# Patient Record
Sex: FEMALE | Race: WHITE | Employment: FULL TIME
[De-identification: names, ages, dates, MRNs, and addresses within clinical notes are randomized per-mention and may not be internally consistent; named-entity substitution may affect disease eponyms.]

---

## 2021-09-08 ENCOUNTER — TRANSCRIBE ORDERS (OUTPATIENT)
Dept: OTHER | Age: 56
End: 2021-09-08

## 2021-09-08 DIAGNOSIS — M25.552 HIP PAIN, LEFT: ICD-10-CM

## 2021-09-08 DIAGNOSIS — M54.16 LUMBAR RADICULAR PAIN: Primary | ICD-10-CM

## 2021-09-15 ENCOUNTER — TELEPHONE (OUTPATIENT)
Dept: NEUROSURGERY | Facility: CLINIC | Age: 56
End: 2021-09-15

## 2021-09-15 NOTE — TELEPHONE ENCOUNTER
I spoke to the patient she is requesting an appt after Oct 1st. Patient scheduled with Dr. Sanchez for 10/07/21 at 11:10 am. She was given our clinic address.

## 2021-09-16 NOTE — TELEPHONE ENCOUNTER
SPINE PATIENTS - NEW PROTOCOL PREVISIT    RECORDS RECEIVED FROM: External   REASON FOR VISIT: Lumbar radicular pain Hip pain, left   Date of Appt: 10/7/21   NOTES (FOR ALL VISITS) STATUS DETAILS   OFFICE NOTE from referring provider Care Everywhere Amber Davenport NP @ [facility] PCP:  9/9/21   OFFICE NOTE from other specialist N/A    DISCHARGE SUMMARY from hospital N/A    DISCHARGE REPORT from ER N/A    EMG REPORT N/A    MEDICATION LIST Care Everywhere    IMAGING  (FOR ALL VISITS)     MRI (HEAD, NECK, SPINE) Received [facility]:  MRI Lumbar Spine 8/19/21  MRI Lumbar Spine 3/16/21   XRAY (SPINE) *NEUROSURGERY* Received [facility]:  XR Cervical Spine 11/25/20  XR Cervical Spine 11/16/20   CT (HEAD, NECK, SPINE) N/A       Action 9/16/21 MV 11.42am   Action Taken Imaging request faxed to [facility] for:  MRI Lumbar Spine 8/19/21  MRI Lumbar Spine 3/16/21  XR Cervical Spine 11/25/20  XR Cervical Spine 11/16/20    --9/17/21 MV 2.57pm--  Images resolved in PACS

## 2021-09-29 DIAGNOSIS — M54.50 LOW BACK PAIN: Primary | ICD-10-CM

## 2021-10-05 ENCOUNTER — TELEPHONE (OUTPATIENT)
Dept: NEUROSURGERY | Facility: CLINIC | Age: 56
End: 2021-10-05

## 2021-10-07 ENCOUNTER — ANCILLARY PROCEDURE (OUTPATIENT)
Dept: GENERAL RADIOLOGY | Facility: CLINIC | Age: 56
End: 2021-10-07
Attending: NEUROLOGICAL SURGERY
Payer: COMMERCIAL

## 2021-10-07 ENCOUNTER — OFFICE VISIT (OUTPATIENT)
Dept: NEUROSURGERY | Facility: CLINIC | Age: 56
End: 2021-10-07
Payer: COMMERCIAL

## 2021-10-07 ENCOUNTER — PRE VISIT (OUTPATIENT)
Dept: NEUROSURGERY | Facility: CLINIC | Age: 56
End: 2021-10-07

## 2021-10-07 VITALS
SYSTOLIC BLOOD PRESSURE: 142 MMHG | OXYGEN SATURATION: 97 % | HEART RATE: 78 BPM | WEIGHT: 145 LBS | RESPIRATION RATE: 16 BRPM | BODY MASS INDEX: 25.69 KG/M2 | DIASTOLIC BLOOD PRESSURE: 91 MMHG | HEIGHT: 63 IN

## 2021-10-07 DIAGNOSIS — M54.16 LUMBAR RADICULOPATHY: Primary | ICD-10-CM

## 2021-10-07 DIAGNOSIS — M40.30 FLATBACK SYNDROME: ICD-10-CM

## 2021-10-07 DIAGNOSIS — M54.50 LOW BACK PAIN: ICD-10-CM

## 2021-10-07 DIAGNOSIS — M54.12 CERVICAL RADICULOPATHY: ICD-10-CM

## 2021-10-07 PROCEDURE — 77073 BONE LENGTH STUDIES: CPT | Performed by: STUDENT IN AN ORGANIZED HEALTH CARE EDUCATION/TRAINING PROGRAM

## 2021-10-07 PROCEDURE — 72082 X-RAY EXAM ENTIRE SPI 2/3 VW: CPT | Performed by: STUDENT IN AN ORGANIZED HEALTH CARE EDUCATION/TRAINING PROGRAM

## 2021-10-07 PROCEDURE — 99204 OFFICE O/P NEW MOD 45 MIN: CPT | Performed by: NEUROLOGICAL SURGERY

## 2021-10-07 RX ORDER — HYDROCODONE BITARTRATE AND ACETAMINOPHEN 5; 325 MG/1; MG/1
1 TABLET ORAL
COMMUNITY

## 2021-10-07 RX ORDER — VALACYCLOVIR HYDROCHLORIDE 1 G/1
TABLET, FILM COATED ORAL
COMMUNITY
Start: 2020-07-20

## 2021-10-07 RX ORDER — PROCHLORPERAZINE MALEATE 10 MG
TABLET ORAL
COMMUNITY

## 2021-10-07 RX ORDER — CYCLOBENZAPRINE HCL 5 MG
TABLET ORAL
COMMUNITY
Start: 2021-03-11

## 2021-10-07 RX ORDER — METOPROLOL SUCCINATE 25 MG/1
TABLET, EXTENDED RELEASE ORAL
COMMUNITY
Start: 2021-07-23

## 2021-10-07 RX ORDER — CHLORAL HYDRATE 500 MG
1 CAPSULE ORAL
COMMUNITY

## 2021-10-07 RX ORDER — PANTOPRAZOLE SODIUM 40 MG/1
40 TABLET, DELAYED RELEASE ORAL
COMMUNITY
Start: 2021-05-25 | End: 2022-05-25

## 2021-10-07 RX ORDER — NORTRIPTYLINE HCL 10 MG
CAPSULE ORAL
COMMUNITY
Start: 2020-10-20

## 2021-10-07 RX ORDER — SIMVASTATIN 20 MG
TABLET ORAL
COMMUNITY
Start: 2021-07-23

## 2021-10-07 RX ORDER — LOSARTAN POTASSIUM 100 MG/1
100 TABLET ORAL
COMMUNITY
Start: 2021-03-12 | End: 2022-03-12

## 2021-10-07 RX ORDER — ONDANSETRON 4 MG/1
TABLET, FILM COATED ORAL
COMMUNITY

## 2021-10-07 ASSESSMENT — PAIN SCALES - GENERAL: PAINLEVEL: SEVERE PAIN (7)

## 2021-10-07 ASSESSMENT — MIFFLIN-ST. JEOR: SCORE: 1211.85

## 2021-10-07 NOTE — PATIENT INSTRUCTIONS
Call [phone] to schedule a video or telephone visit with Dr. Sanchez after images of your cervical spine have been uploaded to the [facility] image system for Dr. Sanchez' review.

## 2021-10-07 NOTE — NURSING NOTE
Chief Complaint   Patient presents with     Consult     [facility] New - Lumbar radiculopathy, left hip pain     Evaristo Soto

## 2021-10-07 NOTE — PROGRESS NOTES
Neurosurgery Clinic Note    Chief Complaint: back pain    History of Present Illness:  It was a pleasure to evaluate Eduarda Paz in clinic today   Today's visit was for back pain, patient informs me that she has discs with imaging of her cervical spine with her today and would like me to evaluate her neck, I informed her that we can upload those discs to our system in the next several days and I can then view them in a future appointment with her if she wants to discuss her neck. She has had prior ACDF and said her surgeon said she needs more levels fused.    Eduarda Paz is a 56 year old female presenting with neck pain radiating to bilateral arms, left worse than right, numbness in fingers 3/4/5. Worse with movement, no relief with PT or injections.    Low back pain radiating to left leg, sometimes to groin and inner thigh, most of time to lateral thigh/calf/dorsum of foot, worse with standing and walking and sleeping. Two prior lumbar microdiskectomies in .      Surgical History      Surgery Date Site/Laterality Comments   BACK SURGERY ,1996   x2      SECTION 1994 - 1994        FOOT SURGERY 1998 - 1998        BREAST BIOPSY   Bilateral Right Breast, 10/25/07 benign; x 2 on left breast      COLONOSCOPY          SURGERY     conization at approximately age 19     SURGERY     Lumbar surgery  and      CORRECTION,HALLUX VALGUS, W/SESAMIODECTOMY; W/DOUBLE OSTEOTOMY, ANY METHOD 2020 Foot/Right Procedure: DECOMPRESSION OSTEOTOMY (DOUBLE) 67330. APPLICATION 12 UNITS.; Surgeon: Didier Etienne DPM; Location: [facility]; Service: Podiatry    Medical devices from this surgery are in the Implants section.       Medical History      Medical History Date Comments   Anxiety disorder   flight anxiety, uses Ativan with flying   Atherosclerosis  cardiac CT - mild LAD soft plaque   Cervical dysplasia   She had this in the distant past, at about age 19. No  abnormal Paps since her cone biopsy at that time.   Lumbar radiculopathy   DOI 2014, Probably Left L4   GERD (gastroesophageal reflux disease)       Helicobacter pylori (H. pylori) infection      Lumbar back sprain 2014     Metrorrhagia       Pain in joint, multiple sites       Elevated sed rate       Oral herpes simplex infection   uses occasional Valtrex for flares   Hypertension, essential       Metrorrhagia   controlled with OCP's   Right lateral epicondylitis       Urinary tract infection       Depression       Hypertension       Sleep apnea   negative sleep study     Family History      Medical History Relation Name Comments   Cancer (other) Father Don     Lung Cancer Father Don     Arthritis Mother Merly     Heart Disease Mother Merly     Hyperlipidemia Mother Merly     Hypertension Mother Merly     Diabetes Paternal Grandmother Nunny     Breast Cancer Sister Farrah       Relation Name Status Comments   Father Don  (Age 44)     Mother Merly  (Age 71)     Paternal Grandmother Nunny       Sister   Alive     Sister Farrah         Social History      Tobacco Use Types Packs/Day Years Used Date   Former Smoker Cigarettes     Quit: 10/13/2002   Smokeless Tobacco: Never Used           Alcohol Use Standard Drinks/Week Comments   Yes 7 (1 standard drink = 0.6 oz pure alcohol)       Alcohol Habits Answer Date Recorded   How often do you have a drink containing alcohol? 4 or more times a week 2021   How many drinks containing alcohol do you have on a typical day when you are drinking? 3 or 4 2021   How often do you have six or more drinks on one occasion? Never 2021   Comment: Not asked       Social Isolation Answer Date Recorded   In a typical week, how many times do you talk on the phone with family, friends, or neighbors? More than three times a week 2021   How often do you get together with friends or relatives? Once a week 2021   How often do you attend  Episcopal or Jainism services? Never 07/23/2021   Do you belong to any clubs or organizations such as Episcopal groups, unions, fraternal or athletic groups, or school groups? No 07/23/2021   How often do you attend meetings of the clubs or organizations you belong to? Never 07/23/2021   Are you now , , , , never  or living with a partner?  07/23/2021     Physical Activity Answer Date Recorded   On average, how many days per week do you engage in moderate to strenuous exercise (like walking fast, running, jogging, dancing, swimming, biking, or other activities that cause a light or heavy sweat)? 0 days 07/23/2021   On average, how many minutes do you engage in exercise at this level? 0 min 07/23/2021     Stress Answer Date Recorded   Do you feel stress - tense, restless, nervous, or anxious, or unable to sleep at night because your mind is troubled all the time - these days? Rather much 07/23/2021     Financial Resource Strain Answer Date Recorded   How hard is it for you to pay for the very basics like food, housing, medical care, and heating? Somewhat hard 07/23/2021     Intimate Partner Violence Answer Date Recorded   Within the last year, have you been afraid of your partner or ex-partner? No 07/23/2021   Within the last year, have you been humiliated or emotionally abused in other ways by your partner or ex-partner? No 07/23/2021   Within the last year, have you been kicked, hit, slapped, or otherwise physically hurt by your partner or ex-partner? No 07/23/2021   Within the last year, have you been raped or forced to have any kind of sexual activity by your partner or ex-partner? No 07/23/2021     Food Insecurity Answer Date Recorded   Within the past 12 months, you worried that your food would run out before you got money to buy more. Never true 07/23/2021   Within the past 12 months, the food you bought just didn't last and you didn't have money to get more. Never true  07/23/2021     Transportation Needs Answer Date Recorded   In the past 12 months, has lack of transportation kept you from medical appointments or from getting medications? No 07/23/2021   In the past 12 months, has lack of transportation kept you from meetings, work, or getting things needed for daily living? No 07/23/2021     Housing Stability Answer Date Recorded   In the last 12 months, was there a time when you were not able to pay the mortgage or rent on time? No 07/23/2021   In the last 12 months, how many places have you lived? 1 07/23/2021   In the last 12 months, was there a time when you did not have a steady place to sleep or slept in a shelter (including now)? No 07/23/2021     Education Answer Date Recorded   What is the highest level of school you have completed or the highest degree you have received? 12th grade 05/17/2021     Sex Assigned at Birth Date Recorded   Not on file       COVID-19 Exposure Response Date Recorded   In the last month, have you been in contact with someone who was confirmed or suspected to have Coronavirus / COVID-19? No / Unsure 9/7/2021 9:20 AM CDT       Allergies      No Known Active Allergies  Medications      Medication Sig Dispensed Refills Start Date End Date Status   aspirin (AKA: ECOTRIN) 81 mg oral TbEC   Take 81 mg by mouth once daily.   0     Active   Calcium 600 mg oral Capsule   Take 600 mg by mouth once daily.   0     Active   fish oil-omega-3 fatty acids 340-1,000 mg oral Capsule   Take 1 capsule by mouth.   0     Active   valACYclovir (VALTREX) 1000 mg oral Tablet    Indications: Cold sore Take 2 tablets (2 g) by mouth twice daily as needed (cold sores). 12 tablet   3 07/20/2020   Active   nortriptyline (PAMELOR) 10 mg oral Capsule    Indications: Lumbar radiculopathy, Work related injury Take 3-5 capsules (30-50 mg) by mouth at bedtime as needed for sleep. 150 capsule   1 10/20/2020   Active   losartan (COZAAR) 100 mg oral Tablet    Indications: Essential  hypertension Take 1 tablet (100 mg) by mouth once daily. 90 tablet   2 03/12/2021 03/12/2022 Active   HYDROcodone-acetaminophen (NORCO) 5-325 mg oral Tablet   Take 1 tablet by mouth every 4 hours as needed.   0     Active   pantoprazole (PROTONIX) 40 mg oral Tablet, Delayed Release (E.C.)    Indications: Gastroesophageal reflux disease without esophagitis Take 1 Tablet (40 mg) by mouth once daily on an empty stomach. Take 30-60 min before breakfast 90 Tablet   3 05/25/2021 05/25/2022 Active   metoprolol succinate (TOPROL XL) 25 mg oral Tablet Sustained Release 24HR    Indications: Hypertension, essential Take 0.5 Tablets (12.5 mg) by mouth once daily. 45 Tablet   3 07/23/2021 10/21/2021 Active   simvastatin (ZOCOR) 20 mg oral Tablet    Indications: Hypercholesterolemia Take 1 Tablet (20 mg) by mouth daily at bedtime. 90 Tablet   3 07/23/2021 07/23/2022 Active     Active Problems  Reconcile with Patient's Chart    Problem Noted Date   Postviral fatigue syndrome 05/19/2021   History of COVID-19 05/19/2021   Lumbar radicular pain 02/18/2020   Overview:     Formatting of this note might be different from the original.  Added automatically from request for surgery [id number]     Cervical radicular pain 01/21/2020   Overview:     Formatting of this note might be different from the original.  Added automatically from request for surgery [id number]     Ganglion cyst of volar aspect of right wrist 08/02/2019   Cervical spondylosis with radiculopathy 02/18/2019   Neural foraminal stenosis of cervical spine 01/18/2019   Overview:     Formatting of this note might be different from the original.  Added automatically from request for surgery [id number]     Cervical dysplasia 10/10/2018   Overview:     Formatting of this note might be different from the original.  She had this in the distant past, at about age 19. No abnormal Paps since her cone biopsy at that time.     Chronic bilateral low back pain without sciatica 10/10/2018   Chronic  "neck pain 03/14/2018   Overview:     Formatting of this note might be different from the original.  Workers comp denies the claim.     Elevated sed rate 03/14/2018   Right lateral epicondylitis 03/14/2018   Pain in joint, multiple sites 03/14/2018   Anaplasmosis 03/13/2018   Backache 03/13/2018   Diverticulitis 03/13/2018   GERD (gastroesophageal reflux disease) 03/13/2018   Oral herpes simplex infection 03/13/2018   Hypercholesterolemia 03/13/2018   Lumbar radiculopathy 03/13/2018   Metrorrhagia 03/13/2018   Obstructive sleep apnea 03/13/2018   Trochanteric bursitis of both hips 10/31/2017   Lumbar back sprain 09/30/2014   Atherosclerosis 01/01/2008   Overview:     Formatting of this note might be different from the original.  cardiac CT - mild LAD soft plaque     Hypertension, essential             IMAGING per my own measurement and interpretation:  Xrays:L4-S1 hypolordosis        Bone Density:  No results found.    Vitamin D:  Vitamin D Deficiency Screening Results:  No results found for: VITDT  No results found for: CSA269, CSBO112, SWEG91VDNPM, VITD3, D2VIT, D3VIT, DTOT, [id number], [id number], [id number], [id number], [id number], [id number]      Nutritional Status:  Estimated body mass index is 25.69 kg/m  as calculated from the following:    Height as of this encounter: 1.6 m (5' 3\").    Weight as of this encounter: 65.8 kg (145 lb).    No results found for: ALBUMIN    Diabetes Screening:  No results found for: A1C    Nicotine Usage:    No                Physical Exam   BP (!) 142/91   Pulse 78   Resp 16   Ht 1.6 m (5' 3\")   Wt 65.8 kg (145 lb)   SpO2 97%   BMI 25.69 kg/m    Constitutional: Oriented to person, place, and time. Appears well-developed and well-nourished. Cooperative. No distress.   Neurological: alert and oriented to person, place, and time.   No cranial nerve deficit   sensory deficit left fingers 3-5, left leg lateral calf/dorsum foot    Gait normal      Reflex Scores:               " Bicep reflexes are 1+ on the right side and 1+ on the left side.       Brachioradialis reflexes are 2+ on the right side and 2+ on the left side.       Patellar reflexes are 2+ on the right side and 2+ on the left side.       Achilles reflexes are 2+ on the right side and 2+ on the left side.    STRENGTH LEFT RIGHT   Deltoid 5 5   Bicep 5 5   Wrist Extensor 5 5   Tricep 5 5   Finger flexion 5 5   Finger abduction 5 5    5 5       Hip Flexion     5     5   Knee Extension 5 5   Ankle Dorsiflexion 4 5   Extensor Hallucis Longus 4 5   Plantar Flexion 5 5   Foot eversion 4 5   Foot inversion 4 5     No Lhermitte's, No Spurling's  No Nino's   No ankle clonus  Able to tandem walk      Sacroiliac Joint Exam LEFT RIGHT   Donya Finger Test - -   PSIS tenderness - -   ThighThrust - -   MARGARITA - -   Pelvic Gapping - -   Pelvic Compression - -   Gaenslen s - -   Sacral Thrust - -   Hip Exam     Posterior impingement - -   Medial femoral impingement - -         Skin: Skin is warm, dry and intact.   Psychiatric: Normal mood and affect. Speech is normal and behavior is normal.        ASSESSMENT:  Eduarda Paz is a 56 year old female with lumbar hypolordosis L4-S1 and left L4 and L5 radiculopathy; cervical radiculopathy    PLAN:    Patient would like images of cervical spine uploaded and to have virtual appointment to discuss these in the future.  Discussed that she is a candidate for L4-S1 TLIF with SPOs for lordosis restoration (PI is 61, L4-S1 is 33 should be >40)  Patient will schedule appointment to discuss cervical spine once images are uploaded        Sofi Sanchez MD    [facility] Department of Neurosurgery  Complex Spinal Deformity, Scoliosis, and Minimally Invasive Spine Surgery Specialist  Office: [phone]    10/7/2021          I spent 30 minutes in patient care with greater than 50% spent in counseling and/or coordination of care.

## 2021-10-07 NOTE — LETTER
Date:December 8, 2021      Patient was self referred, no letter generated. Do not send.        [facility] Health Information

## 2021-10-07 NOTE — LETTER
10/7/2021       RE: Eduarda Paz  [address] [address]     Dear Colleague,    Thank you for referring your patient, Eduarda Paz, to the [facility] [place] at [facility]. Please see a copy of my visit note below.        Neurosurgery Clinic Note    Chief Complaint: back pain    History of Present Illness:  It was a pleasure to evaluate Eduarda Paz in clinic today   Today's visit was for back pain, patient informs me that she has discs with imaging of her cervical spine with her today and would like me to evaluate her neck, I informed her that we can upload those discs to our system in the next several days and I can then view them in a future appointment with her if she wants to discuss her neck. She has had prior ACDF and said her surgeon said she needs more levels fused.    Eduarda Paz is a 56 year old female presenting with neck pain radiating to bilateral arms, left worse than right, numbness in fingers 3/4/5. Worse with movement, no relief with PT or injections.    Low back pain radiating to left leg, sometimes to groin and inner thigh, most of time to lateral thigh/calf/dorsum of foot, worse with standing and walking and sleeping. Two prior lumbar microdiskectomies in .      Surgical History      Surgery Date Site/Laterality Comments   BACK SURGERY ,1996   x2      SECTION 1994 - 1994        FOOT SURGERY 1998 - 1998        BREAST BIOPSY   Bilateral Right Breast, 10/25/07 benign; x 2 on left breast      COLONOSCOPY          SURGERY     conization at approximately age 19     SURGERY     Lumbar surgery  and      CORRECTION,HALLUX VALGUS, W/SESAMIODECTOMY; W/DOUBLE OSTEOTOMY, ANY METHOD 2020 Foot/Right Procedure: DECOMPRESSION OSTEOTOMY (DOUBLE) 67597. APPLICATION 12 UNITS.; Surgeon: Didier Etienne DPM; Location: [facility]; Service: Podiatry     Medical devices from this surgery are in the Implants section.       Medical History      Medical History Date Comments   Anxiety disorder   flight anxiety, uses Ativan with flying   Atherosclerosis  cardiac CT - mild LAD soft plaque   Cervical dysplasia   She had this in the distant past, at about age 19. No abnormal Paps since her cone biopsy at that time.   Lumbar radiculopathy   DOI 2014, Probably Left L4   GERD (gastroesophageal reflux disease)       Helicobacter pylori (H. pylori) infection      Lumbar back sprain 2014     Metrorrhagia       Pain in joint, multiple sites       Elevated sed rate       Oral herpes simplex infection   uses occasional Valtrex for flares   Hypertension, essential       Metrorrhagia   controlled with OCP's   Right lateral epicondylitis       Urinary tract infection       Depression       Hypertension       Sleep apnea   negative sleep study     Family History      Medical History Relation Name Comments   Cancer (other) Father Don     Lung Cancer Father Don     Arthritis Mother Merly     Heart Disease Mother Merly     Hyperlipidemia Mother Merly     Hypertension Mother Merly     Diabetes Paternal Grandmother Nunny     Breast Cancer Sister Farrah       Relation Name Status Comments   Father Don  (Age 44)     Mother Merly  (Age 71)     Paternal Grandmother Nunny       Sister   Alive     Sister Farrah         Social History      Tobacco Use Types Packs/Day Years Used Date   Former Smoker Cigarettes     Quit: 10/13/2002   Smokeless Tobacco: Never Used           Alcohol Use Standard Drinks/Week Comments   Yes 7 (1 standard drink = 0.6 oz pure alcohol)       Alcohol Habits Answer Date Recorded   How often do you have a drink containing alcohol? 4 or more times a week 2021   How many drinks containing alcohol do you have on a typical day when you are drinking? 3 or 4 2021   How often do you have six or more drinks on one occasion? Never  07/23/2021   Comment: Not asked       Social Isolation Answer Date Recorded   In a typical week, how many times do you talk on the phone with family, friends, or neighbors? More than three times a week 07/23/2021   How often do you get together with friends or relatives? Once a week 07/23/2021   How often do you attend Mosque or Yazidi services? Never 07/23/2021   Do you belong to any clubs or organizations such as Mosque groups, unions, fraternal or athletic groups, or school groups? No 07/23/2021   How often do you attend meetings of the clubs or organizations you belong to? Never 07/23/2021   Are you now , , , , never  or living with a partner?  07/23/2021     Physical Activity Answer Date Recorded   On average, how many days per week do you engage in moderate to strenuous exercise (like walking fast, running, jogging, dancing, swimming, biking, or other activities that cause a light or heavy sweat)? 0 days 07/23/2021   On average, how many minutes do you engage in exercise at this level? 0 min 07/23/2021     Stress Answer Date Recorded   Do you feel stress - tense, restless, nervous, or anxious, or unable to sleep at night because your mind is troubled all the time - these days? Rather much 07/23/2021     Financial Resource Strain Answer Date Recorded   How hard is it for you to pay for the very basics like food, housing, medical care, and heating? Somewhat hard 07/23/2021     Intimate Partner Violence Answer Date Recorded   Within the last year, have you been afraid of your partner or ex-partner? No 07/23/2021   Within the last year, have you been humiliated or emotionally abused in other ways by your partner or ex-partner? No 07/23/2021   Within the last year, have you been kicked, hit, slapped, or otherwise physically hurt by your partner or ex-partner? No 07/23/2021   Within the last year, have you been raped or forced to have any kind of sexual activity by  your partner or ex-partner? No 07/23/2021     Food Insecurity Answer Date Recorded   Within the past 12 months, you worried that your food would run out before you got money to buy more. Never true 07/23/2021   Within the past 12 months, the food you bought just didn't last and you didn't have money to get more. Never true 07/23/2021     Transportation Needs Answer Date Recorded   In the past 12 months, has lack of transportation kept you from medical appointments or from getting medications? No 07/23/2021   In the past 12 months, has lack of transportation kept you from meetings, work, or getting things needed for daily living? No 07/23/2021     Housing Stability Answer Date Recorded   In the last 12 months, was there a time when you were not able to pay the mortgage or rent on time? No 07/23/2021   In the last 12 months, how many places have you lived? 1 07/23/2021   In the last 12 months, was there a time when you did not have a steady place to sleep or slept in a shelter (including now)? No 07/23/2021     Education Answer Date Recorded   What is the highest level of school you have completed or the highest degree you have received? 12th grade 05/17/2021     Sex Assigned at Birth Date Recorded   Not on file       COVID-19 Exposure Response Date Recorded   In the last month, have you been in contact with someone who was confirmed or suspected to have Coronavirus / COVID-19? No / Unsure 9/7/2021 9:20 AM CDT       Allergies      No Known Active Allergies  Medications      Medication Sig Dispensed Refills Start Date End Date Status   aspirin (AKA: ECOTRIN) 81 mg oral TbEC   Take 81 mg by mouth once daily.   0     Active   Calcium 600 mg oral Capsule   Take 600 mg by mouth once daily.   0     Active   fish oil-omega-3 fatty acids 340-1,000 mg oral Capsule   Take 1 capsule by mouth.   0     Active   valACYclovir (VALTREX) 1000 mg oral Tablet    Indications: Cold sore Take 2 tablets (2 g) by mouth twice daily as needed  (cold sores). 12 tablet   3 07/20/2020   Active   nortriptyline (PAMELOR) 10 mg oral Capsule    Indications: Lumbar radiculopathy, Work related injury Take 3-5 capsules (30-50 mg) by mouth at bedtime as needed for sleep. 150 capsule   1 10/20/2020   Active   losartan (COZAAR) 100 mg oral Tablet    Indications: Essential hypertension Take 1 tablet (100 mg) by mouth once daily. 90 tablet   2 03/12/2021 03/12/2022 Active   HYDROcodone-acetaminophen (NORCO) 5-325 mg oral Tablet   Take 1 tablet by mouth every 4 hours as needed.   0     Active   pantoprazole (PROTONIX) 40 mg oral Tablet, Delayed Release (E.C.)    Indications: Gastroesophageal reflux disease without esophagitis Take 1 Tablet (40 mg) by mouth once daily on an empty stomach. Take 30-60 min before breakfast 90 Tablet   3 05/25/2021 05/25/2022 Active   metoprolol succinate (TOPROL XL) 25 mg oral Tablet Sustained Release 24HR    Indications: Hypertension, essential Take 0.5 Tablets (12.5 mg) by mouth once daily. 45 Tablet   3 07/23/2021 10/21/2021 Active   simvastatin (ZOCOR) 20 mg oral Tablet    Indications: Hypercholesterolemia Take 1 Tablet (20 mg) by mouth daily at bedtime. 90 Tablet   3 07/23/2021 07/23/2022 Active     Active Problems  Reconcile with Patient's Chart    Problem Noted Date   Postviral fatigue syndrome 05/19/2021   History of COVID-19 05/19/2021   Lumbar radicular pain 02/18/2020   Overview:     Formatting of this note might be different from the original.  Added automatically from request for surgery [id number]     Cervical radicular pain 01/21/2020   Overview:     Formatting of this note might be different from the original.  Added automatically from request for surgery [id number]     Ganglion cyst of volar aspect of right wrist 08/02/2019   Cervical spondylosis with radiculopathy 02/18/2019   Neural foraminal stenosis of cervical spine 01/18/2019   Overview:     Formatting of this note might be different from the original.  Added automatically  "from request for surgery [id number]     Cervical dysplasia 10/10/2018   Overview:     Formatting of this note might be different from the original.  She had this in the distant past, at about age 19. No abnormal Paps since her cone biopsy at that time.     Chronic bilateral low back pain without sciatica 10/10/2018   Chronic neck pain 03/14/2018   Overview:     Formatting of this note might be different from the original.  Workers comp denies the claim.     Elevated sed rate 03/14/2018   Right lateral epicondylitis 03/14/2018   Pain in joint, multiple sites 03/14/2018   Anaplasmosis 03/13/2018   Backache 03/13/2018   Diverticulitis 03/13/2018   GERD (gastroesophageal reflux disease) 03/13/2018   Oral herpes simplex infection 03/13/2018   Hypercholesterolemia 03/13/2018   Lumbar radiculopathy 03/13/2018   Metrorrhagia 03/13/2018   Obstructive sleep apnea 03/13/2018   Trochanteric bursitis of both hips 10/31/2017   Lumbar back sprain 09/30/2014   Atherosclerosis 01/01/2008   Overview:     Formatting of this note might be different from the original.  cardiac CT - mild LAD soft plaque     Hypertension, essential             IMAGING per my own measurement and interpretation:  Xrays:L4-S1 hypolordosis        Bone Density:  No results found.    Vitamin D:  Vitamin D Deficiency Screening Results:  No results found for: VITDT  No results found for: MTA842, KMPG962, XAGL57GBNIB, VITD3, D2VIT, D3VIT, DTOT, [id number], [id number], [id number], [id number], [id number], [id number]      Nutritional Status:  Estimated body mass index is 25.69 kg/m  as calculated from the following:    Height as of this encounter: 1.6 m (5' 3\").    Weight as of this encounter: 65.8 kg (145 lb).    No results found for: ALBUMIN    Diabetes Screening:  No results found for: A1C    Nicotine Usage:    No                Physical Exam   BP (!) 142/91   Pulse 78   Resp 16   Ht 1.6 m (5' 3\")   Wt 65.8 kg (145 lb)   SpO2 97%   BMI 25.69 kg/m  "   Constitutional: Oriented to person, place, and time. Appears well-developed and well-nourished. Cooperative. No distress.   Neurological: alert and oriented to person, place, and time.   No cranial nerve deficit   sensory deficit left fingers 3-5, left leg lateral calf/dorsum foot    Gait normal      Reflex Scores:               Bicep reflexes are 1+ on the right side and 1+ on the left side.       Brachioradialis reflexes are 2+ on the right side and 2+ on the left side.       Patellar reflexes are 2+ on the right side and 2+ on the left side.       Achilles reflexes are 2+ on the right side and 2+ on the left side.    STRENGTH LEFT RIGHT   Deltoid 5 5   Bicep 5 5   Wrist Extensor 5 5   Tricep 5 5   Finger flexion 5 5   Finger abduction 5 5    5 5       Hip Flexion     5     5   Knee Extension 5 5   Ankle Dorsiflexion 4 5   Extensor Hallucis Longus 4 5   Plantar Flexion 5 5   Foot eversion 4 5   Foot inversion 4 5     No Lhermitte's, No Spurling's  No Nino's   No ankle clonus  Able to tandem walk      Sacroiliac Joint Exam LEFT RIGHT   Donya Finger Test - -   PSIS tenderness - -   ThighThrust - -   MARGARITA - -   Pelvic Gapping - -   Pelvic Compression - -   Gaenslen s - -   Sacral Thrust - -   Hip Exam     Posterior impingement - -   Medial femoral impingement - -         Skin: Skin is warm, dry and intact.   Psychiatric: Normal mood and affect. Speech is normal and behavior is normal.        ASSESSMENT:  Eduarda Paz is a 56 year old female with lumbar hypolordosis L4-S1 and left L4 and L5 radiculopathy; cervical radiculopathy    PLAN:    Patient would like images of cervical spine uploaded and to have virtual appointment to discuss these in the future.  Discussed that she is a candidate for L4-S1 TLIF with SPOs for lordosis restoration (PI is 61, L4-S1 is 33 should be >40)  Patient will schedule appointment to discuss cervical spine once images are uploaded        Sofi Sanchez MD  Assistant    [facility] Department of Neurosurgery  Complex Spinal Deformity, Scoliosis, and Minimally Invasive Spine Surgery Specialist  Office: [phone]    10/7/2021          I spent 30 minutes in patient care with greater than 50% spent in counseling and/or coordination of care.        Again, thank you for allowing me to participate in the care of your patient.      Sincerely,    Sofi Sanchez MD

## 2021-10-24 ENCOUNTER — HEALTH MAINTENANCE LETTER (OUTPATIENT)
Age: 56
End: 2021-10-24

## 2022-10-15 ENCOUNTER — HEALTH MAINTENANCE LETTER (OUTPATIENT)
Age: 57
End: 2022-10-15

## 2023-10-29 ENCOUNTER — HEALTH MAINTENANCE LETTER (OUTPATIENT)
Age: 58
End: 2023-10-29